# Patient Record
Sex: FEMALE | Race: BLACK OR AFRICAN AMERICAN | NOT HISPANIC OR LATINO | URBAN - METROPOLITAN AREA
[De-identification: names, ages, dates, MRNs, and addresses within clinical notes are randomized per-mention and may not be internally consistent; named-entity substitution may affect disease eponyms.]

---

## 2021-05-26 ENCOUNTER — APPOINTMENT (RX ONLY)
Dept: URBAN - METROPOLITAN AREA CLINIC 23 | Facility: CLINIC | Age: 66
Setting detail: DERMATOLOGY
End: 2021-05-26

## 2021-05-26 DIAGNOSIS — Z41.9 ENCOUNTER FOR PROCEDURE FOR PURPOSES OTHER THAN REMEDYING HEALTH STATE, UNSPECIFIED: ICD-10-CM

## 2021-05-26 PROCEDURE — ? COSMETIC CONSULTATION: FILLERS

## 2021-05-26 PROCEDURE — ? ADDITIONAL NOTES

## 2021-05-26 ASSESSMENT — LOCATION DETAILED DESCRIPTION DERM
LOCATION DETAILED: RIGHT CENTRAL MALAR CHEEK
LOCATION DETAILED: LEFT CENTRAL MALAR CHEEK

## 2021-05-26 ASSESSMENT — LOCATION SIMPLE DESCRIPTION DERM
LOCATION SIMPLE: RIGHT CHEEK
LOCATION SIMPLE: LEFT CHEEK

## 2021-05-26 ASSESSMENT — LOCATION ZONE DERM: LOCATION ZONE: FACE

## 2021-05-26 NOTE — PROCEDURE: ADDITIONAL NOTES
Additional Notes: Restylane defyne and [employer]-$1400
Detail Level: Simple
Render Risk Assessment In Note?: no

## 2021-06-02 ENCOUNTER — APPOINTMENT (RX ONLY)
Dept: URBAN - METROPOLITAN AREA CLINIC 23 | Facility: CLINIC | Age: 66
Setting detail: DERMATOLOGY
End: 2021-06-02

## 2021-06-02 DIAGNOSIS — Z41.9 ENCOUNTER FOR PROCEDURE FOR PURPOSES OTHER THAN REMEDYING HEALTH STATE, UNSPECIFIED: ICD-10-CM

## 2021-06-02 PROCEDURE — ? ADDITIONAL NOTES

## 2021-06-02 PROCEDURE — ? RESTYLANE LYFT INJECTION

## 2021-06-02 PROCEDURE — ? RESTYLANE DEFYNE INJECTION

## 2021-06-02 NOTE — PROCEDURE: RESTYLANE DEFYNE INJECTION
Price (Use Numbers Only, No Special Characters Or $): 611 Price (Use Numbers Only, No Special Characters Or $): 540

## 2021-06-02 NOTE — PROCEDURE: RESTYLANE LYFT INJECTION
Price (Use Numbers Only, No Special Characters Or $): 131 Price (Use Numbers Only, No Special Characters Or $): 827

## 2021-06-02 NOTE — PROCEDURE: ADDITIONAL NOTES
Detail Level: Simple
Render Risk Assessment In Note?: no
Additional Notes: Patient was numbed with topical lidocaine for about 15 minutes before filler injection

## 2024-08-16 ENCOUNTER — APPOINTMENT (EMERGENCY)
Dept: RADIOLOGY | Facility: HOSPITAL | Age: 69
End: 2024-08-16
Payer: COMMERCIAL

## 2024-08-16 ENCOUNTER — HOSPITAL ENCOUNTER (EMERGENCY)
Facility: HOSPITAL | Age: 69
Discharge: HOME | End: 2024-08-16
Attending: EMERGENCY MEDICINE | Admitting: EMERGENCY MEDICINE
Payer: COMMERCIAL

## 2024-08-16 VITALS
OXYGEN SATURATION: 95 % | SYSTOLIC BLOOD PRESSURE: 148 MMHG | TEMPERATURE: 97.3 F | DIASTOLIC BLOOD PRESSURE: 82 MMHG | RESPIRATION RATE: 18 BRPM | WEIGHT: 122 LBS | HEART RATE: 62 BPM

## 2024-08-16 DIAGNOSIS — S01.81XA CHIN LACERATION, INITIAL ENCOUNTER: ICD-10-CM

## 2024-08-16 DIAGNOSIS — R55 SYNCOPE, UNSPECIFIED SYNCOPE TYPE: Primary | ICD-10-CM

## 2024-08-16 DIAGNOSIS — R91.1 LESION OF LUNG: ICD-10-CM

## 2024-08-16 LAB
ALBUMIN SERPL-MCNC: 4.4 G/DL (ref 3.5–5.7)
ALP SERPL-CCNC: 39 IU/L (ref 34–125)
ALT SERPL-CCNC: 19 IU/L (ref 7–52)
ANION GAP SERPL CALC-SCNC: 8 MEQ/L (ref 3–15)
AST SERPL-CCNC: 28 IU/L (ref 13–39)
BASOPHILS # BLD: 0.03 K/UL (ref 0.01–0.1)
BASOPHILS NFR BLD: 0.5 %
BILIRUB SERPL-MCNC: 0.5 MG/DL (ref 0.3–1.2)
BUN SERPL-MCNC: 25 MG/DL (ref 7–25)
CALCIUM SERPL-MCNC: 9.5 MG/DL (ref 8.6–10.3)
CHLORIDE SERPL-SCNC: 104 MEQ/L (ref 98–107)
CO2 SERPL-SCNC: 25 MEQ/L (ref 21–31)
CREAT SERPL-MCNC: 1.1 MG/DL (ref 0.6–1.2)
DIFFERENTIAL METHOD BLD: ABNORMAL
EGFRCR SERPLBLD CKD-EPI 2021: 55.2 ML/MIN/1.73M*2
EOSINOPHIL # BLD: 0.04 K/UL (ref 0.04–0.36)
EOSINOPHIL NFR BLD: 0.6 %
ERYTHROCYTE [DISTWIDTH] IN BLOOD BY AUTOMATED COUNT: 14.8 % (ref 11.7–14.4)
GLUCOSE BLD-MCNC: 121 MG/DL (ref 70–99)
GLUCOSE SERPL-MCNC: 118 MG/DL (ref 70–99)
HCT VFR BLD AUTO: 39 % (ref 35–45)
HGB BLD-MCNC: 13 G/DL (ref 11.8–15.7)
IMM GRANULOCYTES # BLD AUTO: 0.01 K/UL (ref 0–0.08)
IMM GRANULOCYTES NFR BLD AUTO: 0.2 %
LIPASE SERPL-CCNC: 42 U/L (ref 11–82)
LYMPHOCYTES # BLD: 1.13 K/UL (ref 1.2–3.5)
LYMPHOCYTES NFR BLD: 18 %
MCH RBC QN AUTO: 31.5 PG (ref 28–33.2)
MCHC RBC AUTO-ENTMCNC: 33.3 G/DL (ref 32.2–35.5)
MCV RBC AUTO: 94.4 FL (ref 83–98)
MONOCYTES # BLD: 0.56 K/UL (ref 0.28–0.8)
MONOCYTES NFR BLD: 8.9 %
NEUTROPHILS # BLD: 4.52 K/UL (ref 1.7–7)
NEUTS SEG NFR BLD: 71.8 %
NRBC BLD-RTO: 0 %
PDW BLD AUTO: 10.6 FL (ref 9.4–12.3)
PLATELET # BLD AUTO: 238 K/UL (ref 150–369)
POCT TEST: ABNORMAL
POTASSIUM SERPL-SCNC: 4.4 MEQ/L (ref 3.5–5.1)
PROT SERPL-MCNC: 7.6 G/DL (ref 6–8.2)
RBC # BLD AUTO: 4.13 M/UL (ref 3.93–5.22)
SODIUM SERPL-SCNC: 137 MEQ/L (ref 136–145)
TROPONIN I SERPL HS-MCNC: 10.8 PG/ML
TROPONIN I SERPL HS-MCNC: 12.6 PG/ML
WBC # BLD AUTO: 6.29 K/UL (ref 3.8–10.5)

## 2024-08-16 PROCEDURE — 96360 HYDRATION IV INFUSION INIT: CPT | Mod: 59

## 2024-08-16 PROCEDURE — 70450 CT HEAD/BRAIN W/O DYE: CPT

## 2024-08-16 PROCEDURE — 90715 TDAP VACCINE 7 YRS/> IM: CPT | Performed by: EMERGENCY MEDICINE

## 2024-08-16 PROCEDURE — 63600105 HC IODINE BASED CONTRAST: Mod: JZ | Performed by: PHYSICIAN ASSISTANT

## 2024-08-16 PROCEDURE — 84484 ASSAY OF TROPONIN QUANT: CPT | Performed by: EMERGENCY MEDICINE

## 2024-08-16 PROCEDURE — 85025 COMPLETE CBC W/AUTO DIFF WBC: CPT | Performed by: EMERGENCY MEDICINE

## 2024-08-16 PROCEDURE — 83690 ASSAY OF LIPASE: CPT | Performed by: EMERGENCY MEDICINE

## 2024-08-16 PROCEDURE — 71045 X-RAY EXAM CHEST 1 VIEW: CPT

## 2024-08-16 PROCEDURE — 84484 ASSAY OF TROPONIN QUANT: CPT | Mod: 91 | Performed by: EMERGENCY MEDICINE

## 2024-08-16 PROCEDURE — 36415 COLL VENOUS BLD VENIPUNCTURE: CPT

## 2024-08-16 PROCEDURE — 93005 ELECTROCARDIOGRAM TRACING: CPT

## 2024-08-16 PROCEDURE — 85025 COMPLETE CBC W/AUTO DIFF WBC: CPT

## 2024-08-16 PROCEDURE — 90471 IMMUNIZATION ADMIN: CPT | Performed by: EMERGENCY MEDICINE

## 2024-08-16 PROCEDURE — 3E0234Z INTRODUCTION OF SERUM, TOXOID AND VACCINE INTO MUSCLE, PERCUTANEOUS APPROACH: ICD-10-PCS | Performed by: EMERGENCY MEDICINE

## 2024-08-16 PROCEDURE — 93005 ELECTROCARDIOGRAM TRACING: CPT | Performed by: EMERGENCY MEDICINE

## 2024-08-16 PROCEDURE — 25000000 HC PHARMACY GENERAL: Performed by: PHYSICIAN ASSISTANT

## 2024-08-16 PROCEDURE — 25800000 HC PHARMACY IV SOLUTIONS: Performed by: EMERGENCY MEDICINE

## 2024-08-16 PROCEDURE — 80053 COMPREHEN METABOLIC PANEL: CPT | Performed by: EMERGENCY MEDICINE

## 2024-08-16 PROCEDURE — 71275 CT ANGIOGRAPHY CHEST: CPT

## 2024-08-16 PROCEDURE — 99284 EMERGENCY DEPT VISIT MOD MDM: CPT | Mod: 25

## 2024-08-16 PROCEDURE — 3E0337Z INTRODUCTION OF ELECTROLYTIC AND WATER BALANCE SUBSTANCE INTO PERIPHERAL VEIN, PERCUTANEOUS APPROACH: ICD-10-PCS | Performed by: EMERGENCY MEDICINE

## 2024-08-16 PROCEDURE — 63600000 HC DRUGS/DETAIL CODE: Performed by: EMERGENCY MEDICINE

## 2024-08-16 RX ORDER — IBUPROFEN 200 MG
1 TABLET ORAL DAILY
COMMUNITY

## 2024-08-16 RX ORDER — IOPAMIDOL 755 MG/ML
100 INJECTION, SOLUTION INTRAVASCULAR
Status: COMPLETED | OUTPATIENT
Start: 2024-08-16 | End: 2024-08-16

## 2024-08-16 RX ORDER — TRAZODONE HYDROCHLORIDE 50 MG/1
100 TABLET ORAL NIGHTLY
COMMUNITY

## 2024-08-16 RX ORDER — SERTRALINE HYDROCHLORIDE 100 MG/1
100 TABLET, FILM COATED ORAL DAILY
COMMUNITY
Start: 2024-05-03

## 2024-08-16 RX ADMIN — TETANUS TOXOID, REDUCED DIPHTHERIA TOXOID AND ACELLULAR PERTUSSIS VACCINE, ADSORBED 0.5 ML: 5; 2.5; 8; 8; 2.5 SUSPENSION INTRAMUSCULAR at 17:12

## 2024-08-16 RX ADMIN — SODIUM CHLORIDE 1000 ML: 9 INJECTION, SOLUTION INTRAVENOUS at 16:20

## 2024-08-16 RX ADMIN — IOPAMIDOL 100 ML: 755 INJECTION, SOLUTION INTRAVENOUS at 18:05

## 2024-08-16 RX ADMIN — Medication 3 ML: at 15:42

## 2024-08-16 ASSESSMENT — ENCOUNTER SYMPTOMS
DIZZINESS: 0
FEVER: 0
WOUND: 1
NECK PAIN: 0
ABDOMINAL PAIN: 0
DIFFICULTY URINATING: 0
BACK PAIN: 0
HEADACHES: 0
DIARRHEA: 0
SHORTNESS OF BREATH: 0
LIGHT-HEADEDNESS: 0
VOMITING: 1
DIAPHORESIS: 0
NAUSEA: 1

## 2024-08-16 NOTE — ED ATTESTATION NOTE
I have personally seen and examined the patient.  I personally performed the key components of the encounter and provided a substantive portion of the care and medical decision making for this patient.  I reviewed and agree with physician assistant / nurse practitioner’s assessment and plan of care, with the following exceptions: None  My examination, assessment, and plan of care of Ai Muse is as follows:     Exam: Well-appearing, no acute distress awake alert oriented x 3, linear laceration to chin, regular rate and rhythm, lungs clear to auscultation, abdomen soft nontender, no guarding or rebound    Assessment and plan: Patient presents with acute nausea vomiting and likely syncopal episode, she states she started to not feel well after eating last night, possible fall versus syncope, patient does not remember, no known sick contacts, no diarrhea, no chest pain, shortness of breath or palpitations, she has a nonischemic EKG, lab work reassuring, awaiting repeat troponin, chest x-ray reviewed/interpreted by me shows atelectasis, awaiting CTs will reassess     Drake Young,   08/16/24 [id number]

## 2024-08-16 NOTE — DISCHARGE INSTRUCTIONS
Please bring labs and imaging reports you were given tonight with you to your follow up appointments.  Follow up with your pcp in 2-3 days and your oncologist as well. Review findings and discuss further management at that time.  Sutures are absorbable and do not need to be removed unless they are bothering you or still present after a week. Please leave wound alone for 24 hours and do not get wet for this period of time. Please put antibiotic ointment on area twice daily after first 24 hours and monitor for signs of infection.  Return to ED immediately for new or worrisome symptoms.

## 2024-08-16 NOTE — ED PROVIDER NOTES
Emergency Medicine Note  HPI   HISTORY OF PRESENT ILLNESS     68yo F w/ PMH depression who presents to ED for a presumed syncopal episode at home with a chin inj. Pt states she ate something last night that didn't agree with her and she felt nauseated. Around midnight she started to feel like she was going to vomit so ran to the bathroom. Pt states she vomited and immediately felt better. Pt reports the next thing she remembers is waking this morning with bloody pillow and a bloody spot on her carpet coming out of bathroom on way to bed. Pt states she doesn't recall getting back in bed but thinks she may have passed out after vomiting and got herself back in bed afterwards. Pt has a little tenderness in her L temple area and a laceration under her chin and isn't sure how she got it. Denies any other current symptoms.           Patient History   PAST HISTORY     Reviewed from Nursing Triage:       Past Medical History:   Diagnosis Date    Depression        History reviewed. No pertinent surgical history.    History reviewed. No pertinent family history.    Social History     Tobacco Use    Smoking status: Every Day     Types: Cigarettes     Passive exposure: Never    Smokeless tobacco: Never   Substance Use Topics    Alcohol use: Yes    Drug use: Never         Review of Systems   REVIEW OF SYSTEMS     Review of Systems   Constitutional:  Negative for diaphoresis and fever.   Respiratory:  Negative for shortness of breath.    Cardiovascular:  Negative for chest pain.   Gastrointestinal:  Positive for nausea and vomiting (x1 last night). Negative for abdominal pain and diarrhea.   Genitourinary:  Negative for difficulty urinating.   Musculoskeletal:  Negative for back pain and neck pain.   Skin:  Positive for wound.   Neurological:  Negative for dizziness, light-headedness and headaches.         VITALS     ED Vitals      Date/Time Temp Pulse Resp BP SpO2 [facility]   08/16/24 1918 -- -- -- 148/82 -- BMG   08/16/24 1912 -- 62  -- 148/82 95 % BMG   08/16/24 1620 -- 60 18 128/72 100 % BMG   08/16/24 1437 36.3 °C (97.3 °F) 77 16 116/60 99 % SS          Pulse Ox %: 99 % (08/16/24 1540)  Pulse Ox Interpretation: Normal (08/16/24 1540)  Heart Rate: 77 (08/16/24 1540)  Rhythm Strip Interpretation: Normal Sinus Rhythm (08/16/24 1540)     Physical Exam   PHYSICAL EXAM     Physical Exam  Vitals and nursing note reviewed.   Constitutional:       General: She is not in acute distress.     Appearance: Normal appearance. She is normal weight. She is not ill-appearing or toxic-appearing.   HENT:      Head: Normocephalic.     Eyes:      Conjunctiva/sclera: Conjunctivae normal.   Cardiovascular:      Rate and Rhythm: Normal rate and regular rhythm.      Pulses: Normal pulses.      Heart sounds: Normal heart sounds.   Pulmonary:      Effort: Pulmonary effort is normal.      Breath sounds: Normal breath sounds.   Abdominal:      General: Abdomen is flat. There is no distension.      Palpations: Abdomen is soft.      Tenderness: There is no abdominal tenderness.   Musculoskeletal:         General: Normal range of motion.      Cervical back: Normal range of motion and neck supple. No rigidity or tenderness.      Right lower leg: No edema.      Left lower leg: No edema.   Skin:     General: Skin is warm and dry.      Capillary Refill: Capillary refill takes less than 2 seconds.   Neurological:      General: No focal deficit present.      Mental Status: She is alert and oriented to person, place, and time. Mental status is at baseline.   Psychiatric:         Mood and Affect: Mood normal.         Behavior: Behavior normal.         Thought Content: Thought content normal.         Judgment: Judgment normal.           PROCEDURES     Laceration Repair    Date/Time: 8/16/2024 5:07 PM    Performed by: Subha Juarez PA C  Authorized by: Drake Young DO    Anesthesia:     Anesthesia method:  Local infiltration    Local anesthetic:  Lidocaine 2% WITH  epi  Laceration details:     Location:  Face    Face location:  Chin  Exploration:     Wound extent: no foreign bodies/material noted    Treatment:     Area cleansed with:  Soap and water and chlorhexidine    Amount of cleaning:  Standard    Irrigation solution:  Sterile saline    Debridement:  None  Skin repair:     Repair method:  Sutures    Suture size:  5-0    Suture material:  Fast-absorbing gut    Number of sutures:  6  Approximation:     Approximation:  Close  Repair type:     Repair type:  Simple  Post-procedure details:     Dressing:  Non-adherent dressing    Procedure completion:  Tolerated well, no immediate complications       DATA     Results       Procedure Component Value Units Date/Time    Lipase [[id number]]  (Normal) Collected: 08/16/24 1447    Specimen: Blood, Venous Updated: 08/16/24 1649     Lipase 42 U/L     HS Troponin I (with 2 hour reflex) [[id number]]  (Normal) Collected: 08/16/24 1447    Specimen: Blood, Venous Updated: 08/16/24 1536     High Sens Troponin I 12.6 pg/mL     Comprehensive metabolic panel [[id number]]  (Abnormal) Collected: 08/16/24 1447    Specimen: Blood, Venous Updated: 08/16/24 1533     Sodium 137 mEQ/L      Potassium 4.4 mEQ/L      Comment: Results obtained on plasma. Plasma Potassium values may be up to 0.4 mEQ/L less than serum values. The differences may be greater for patients with high platelet or white cell counts.        Chloride 104 mEQ/L      CO2 25 mEQ/L      BUN 25 mg/dL      Creatinine 1.1 mg/dL      Glucose 118 mg/dL      Calcium 9.5 mg/dL      AST (SGOT) 28 IU/L      ALT (SGPT) 19 IU/L      Alkaline Phosphatase 39 IU/L      Total Protein 7.6 g/dL      Comment: Test performed on plasma which typically contains approximately 0.4 g/dL more protein than serum.        Albumin 4.4 g/dL      Bilirubin, Total 0.5 mg/dL      eGFR 55.2 mL/min/1.73m*2      Comment: Calculation based on the Chronic Kidney Disease Epidemiology Collaboration (CKD-EPI) equation refit without  adjustment for race.        Anion Gap 8 mEQ/L     CBC and differential [[id number]]  (Abnormal) Collected: 08/16/24 1447    Specimen: Blood, Venous Updated: 08/16/24 1520     WBC 6.29 K/uL      RBC 4.13 M/uL      Hemoglobin 13.0 g/dL      Hematocrit 39.0 %      MCV 94.4 fL      MCH 31.5 pg      MCHC 33.3 g/dL      RDW 14.8 %      Platelets 238 K/uL      MPV 10.6 fL      Differential Type Auto     nRBC 0.0 %      Immature Granulocytes 0.2 %      Neutrophils 71.8 %      Lymphocytes 18.0 %      Monocytes 8.9 %      Eosinophils 0.6 %      Basophils 0.5 %      Immature Granulocytes, Absolute 0.01 K/uL      Neutrophils, Absolute 4.52 K/uL      Lymphocytes, Absolute 1.13 K/uL      Monocytes, Absolute 0.56 K/uL      Eosinophils, Absolute 0.04 K/uL      Basophils, Absolute 0.03 K/uL             Imaging Results              CT ANGIOGRAPHY CHEST PULMONARY EMBOLISM WITH IV CONTRAST (Final result)  Result time 08/16/24 18:28:48      Final result                   Impression:    IMPRESSION:  No acute pulmonary embolism. Emphysema and chronic bronchitis. Fiducial markers  in the right upper lobe associated with a small nodule and radiating bandlike  parenchymal opacities with groundglass and interstitial thickening potentially  representing radiation changes surrounding a treated or partially treated lung  cancer. Please correlate with prior outside imaging and close attention on  follow-up.    Masslike lesion in the left upper lobe with adjacent pleural thickening and  surrounding groundglass and interstitial thickening, suspicious for an  additional malignancy. Again correlation with prior imaging suggested as well as  potentially PET or biopsy as appropriate.    Left upper lobe 0.8 cm nodule which may be an intrapulmonary metastasis and  should be reassessed on follow-up.    Mild bilateral hilar adenopathy.                   Narrative:    EXAM: CT ANGIOGRAPHY CHEST PULMONARY EMBOLISM W IV CONTRAST    CLINICAL HISTORY: syncope,  cancer, ro PE    COMPARISON: No available studies for direct comparison.    TECHNIQUE: CT chest was performed using thin section reconstructions. Contrast  injection rate and acquisition timing were optimized for opacification of the  pulmonary arteries to evaluate for pulmonary embolism. 3-D RECONSTRUCTION:  Additional 3-D/MIP reconstructions were performed and reviewed. CT DOSE:  One or  more dose reduction techniques (e.g. automated exposure control, adjustment of  the mA and/or kV according to patient size, use of iterative reconstruction  technique) utilized for this examination.    CONTRAST: 100mL of iopamidoL (ISOVUE-370) 370 mg iodine /mL (76 %) injection 100  mL    COMMENT:    PULMONARY ARTERIES: Image Quality is: High noting the following limitations:  None No pulmonary embolism is identified to the proximal subsegmental level.    AIRWAYS, LUNGS AND PLEURA: Patent central airways. Mild distal bronchial wall  thickening with endobronchial mucous plugging. Centrilobular emphysema.  Irregular masslike consolidative opacity with surrounding groundglass and  interstitial thickening in the left upper lobe. Representative component  measures 2.1 x 1.4 cm on series 3 image 58. Patchy nodular consolidative opacity  and groundglass in the adjacent superior segment left lower lobe. Metallic  markers in the right upper lobe on series 3 image 70 associated with a 0.6 cm  nodule on series 3 image 66 with surrounding radiating bandlike parenchymal  opacities groundglass and interstitial thickening as well as mild thickening  along the cranial margin of the right major fissure. Lingular nodule abutting  the fissure measuring 0.8 cm on series 3 image 103. Mild dependent  atelectatic/hypoventilatory changes.    CARDIOMEDIASTINUM: Normal heart size. No pericardial effusion.    AORTA, GREAT VESSELS: Atherosclerotic calcifications without aneurysm.    LYMPH NODES: Mildly enlarged bilateral hilar nodes measuring up to 1.1 cm on  the  right on series 4 image 88.    IMAGED THYROID: Within normal limits    IMAGED ESOPHAGUS: Within normal limits.    UPPER ABDOMEN: Mild common bile duct ectasia measuring 0.9 cm. No significant  intrahepatic duct dilation seen. Correlation can be made with LFTs and follow-up  as warranted.    CHEST WALL: Within normal limits    BONES: Degenerative change of the visualized spine                                         CT HEAD WITHOUT IV CONTRAST (Final result)  Result time 08/16/24 18:11:03      Final result                   Impression:    IMPRESSION:  No acute intracranial abnormality.                 Narrative:    EXAM: CT HEAD WO IV CONTRAST    CLINICAL HISTORY: syncope with head inj    COMPARISON: None.    TECHNIQUE: Routine unenhanced brain CT, with axial images and coronal reformats.  CT DOSE:  One or more dose reduction techniques (e.g. automated exposure  control, adjustment of the mA and/or kV according to patient size, use of  iterative reconstruction technique) utilized for this examination.    COMMENT:    BRAIN PARENCHYMA: No acute intracranial hemorrhage, mass effect or midline  shift. The gray-white matter junction differentiation appears preserved.    VENTRICLES/CISTERNS: Ventricles are normal in size and configuration for age.  Cerebral volume is age appropriate.    BONES: No acute osseous abnormality.    ORBITS: Visualized orbits are unremarkable.    SINUSES: Visualized paranasal sinuses are essentially clear.    SOFT TISSUES: Within normal limits                                       X-RAY CHEST 1 VIEW (Final result)  Result time 08/16/24 15:59:25      Final result                   Impression:    IMPRESSION:  Bandlike linear opacity projecting over the right upper lung with adjacent  surgical clips which may reflect posttreatment change/scarring.  Additional  linear opacities projecting over the lateral periphery of the left upper lung  reflecting subsegmental atelectasis and/or linear  scarring.  No gross evidence  of acute disease in chest.    COMMENT:    Comparison: None.    Technique: A single frontal PA portable upright projection of the chest was  obtained.    FINDINGS:    There are surgical clips overlying the right upper chest with adjacent bandlike  linear opacity likely reflecting scarring.  There is an additional area of  streaky linear opacity projecting over the lateral periphery of the left upper  lung.  There is no airspace consolidation, pleural effusion or pneumothorax.  The cardiomediastinal silhouette is within normal limits. The upper abdomen is  unremarkable. There is no acute osseous abnormality.                 Narrative:    CLINICAL HISTORY: syncope                                      ECG 12 lead          Scoring tools                                  ED Course & MDM   MDM / ED COURSE / CLINICAL IMPRESSION / DISPO     Medical Decision Making  Amount and/or Complexity of Data Reviewed  Labs: ordered. Decision-making details documented in ED Course.  Radiology: ordered. Decision-making details documented in ED Course.  ECG/medicine tests: ordered.    Risk  Prescription drug management.        ED Course as of 08/16/24 2056   Fri Aug 16, 2024   1605 X-RAY CHEST 1 VIEW  IMPRESSION:  Bandlike linear opacity projecting over the right upper lung with adjacent  surgical clips which may reflect posttreatment change/scarring.  Additional  linear opacities projecting over the lateral periphery of the left upper lung  reflecting subsegmental atelectasis and/or linear scarring.  No gross evidence  of acute disease in chest.   [WILMAN]   1605 High Sens Troponin I: 12.6  1st [WILMAN]   1753 Awaiting imaging.  [WILMAN]   1825 High Sens Troponin I: 10.8  2nd [WILMAN]   1825 CT HEAD WITHOUT IV CONTRAST  IMPRESSION:  No acute intracranial abnormality.   [WILMAN]   1840 CT ANGIOGRAPHY CHEST PULMONARY EMBOLISM WITH IV CONTRAST  IMPRESSION:  No acute pulmonary embolism. Emphysema and chronic bronchitis. Fiducial  markers  in the right upper lobe associated with a small nodule and radiating bandlike  parenchymal opacities with groundglass and interstitial thickening potentially  representing radiation changes surrounding a treated or partially treated lung  cancer. Please correlate with prior outside imaging and close attention on  follow-up.     Masslike lesion in the left upper lobe with adjacent pleural thickening and  surrounding groundglass and interstitial thickening, suspicious for an  additional malignancy. Again correlation with prior imaging suggested as well as  potentially PET or biopsy as appropriate.     Left upper lobe 0.8 cm nodule which may be an intrapulmonary metastasis and  should be reassessed on follow-up.     Mild bilateral hilar adenopathy.              Specimen Collected: 08/16/24 18:16 Last Resulted: 08/16/24 18:28       [WILMAN]   1851 Findings reviewed with pt. She was due to have a CT chest 8/9 to assess her lung CA/for monitoring she reports so she will bring a copy of imaging done today to her appt with her pcp and oncologist. Aware rad wants her to have her doc correlate findings fr today with outside imaging as we don't really have much to compare to. Given water/saltines for po chall [WILMAN]      ED Course User Index  [WILMAN] Subha Juarez PA C     Clinical Impression      Syncope, unspecified syncope type   Chin laceration, initial encounter   Lesion of lung     _________________       ED Disposition   Discharge                       Subha Juarez PA C  08/16/24 2056

## 2024-08-16 NOTE — PROGRESS NOTES
"Ai Muse   [id number]    Your doctor has referred you for a CT examination that requires the injection of an iodinated contrast material into your bloodstream. This iodinated contrast material, sometimes referred to as \"x-ray dye\" allows for better interpretation of the x-ray films or CT images and results in a more accurate interpretation of the examination.     Without the use of iodinated contrast (x-ray dye), the examination may be less informative and result in a suboptimal examination, and possibly a delay in diagnosis and, if needed, treatment.     The iodinated contrast material is given through a small needle or catheter placed into a vein, usually on the inside of the elbow, on the back of hand, or in a vein in the foot or lower leg.    The most common, though still rare, potential reaction to an intravenous contrast injection is an allergic-like reaction. Most allergic-like reactions are mild, though a small subset of people can have more severe reactions. Mild reactions include mild / scattered hives, itching, scratchy throat, sneezing and nasal congestion. More severe reactions include facial swelling, severe difficulty breathing, wheezing and anaphylactic shock. Those with a prior history allergic-like reaction to the same class of contrast media (such as CT contrast or MRI contrast) are at the highest risk for an allergic reaction.     If you believe you had an allergic reaction to contrast in the past, please let our staff know. We can determine if this increases your risk for a future reaction and provide steps to decrease the risk. This may delay your examination, but it decreases the risk of having a new and possibly more severe reaction to the contrast injection.    People with a history of prior allergic reactions to other substances (such as unrelated medications and food) and patients with a history of asthma have slightly increased risk for an allergic reaction from contrast " material when compared with the general population, but do not require to be pretreated prior to a contrast injection.    You should notify the physician, nurse or technologist if you have ever had any of these conditions or if you have any questions.

## 2024-08-17 LAB
ATRIAL RATE: 73
P AXIS: 56
PR INTERVAL: 176
QRS DURATION: 86
QT INTERVAL: 406
QTC CALCULATION(BAZETT): 447
R AXIS: 66
T WAVE AXIS: 61
VENTRICULAR RATE: 73